# Patient Record
Sex: MALE | Race: ASIAN
[De-identification: names, ages, dates, MRNs, and addresses within clinical notes are randomized per-mention and may not be internally consistent; named-entity substitution may affect disease eponyms.]

---
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
## 2022-01-11 ENCOUNTER — TRANSCRIPTION ENCOUNTER (OUTPATIENT)
Age: 22
End: 2022-01-11

## 2023-05-03 ENCOUNTER — EMERGENCY (EMERGENCY)
Facility: HOSPITAL | Age: 23
LOS: 0 days | Discharge: HOME | End: 2023-05-03
Attending: EMERGENCY MEDICINE
Payer: COMMERCIAL

## 2023-05-03 VITALS
WEIGHT: 179.9 LBS | OXYGEN SATURATION: 99 % | HEIGHT: 68 IN | SYSTOLIC BLOOD PRESSURE: 132 MMHG | TEMPERATURE: 98 F | HEART RATE: 80 BPM | DIASTOLIC BLOOD PRESSURE: 89 MMHG | RESPIRATION RATE: 16 BRPM

## 2023-05-03 DIAGNOSIS — Z20.3 CONTACT WITH AND (SUSPECTED) EXPOSURE TO RABIES: ICD-10-CM

## 2023-05-03 PROCEDURE — 99283 EMERGENCY DEPT VISIT LOW MDM: CPT

## 2023-05-03 PROCEDURE — 99282 EMERGENCY DEPT VISIT SF MDM: CPT

## 2023-05-03 NOTE — ED PROVIDER NOTE - NSFOLLOWUPINSTRUCTIONS_ED_ALL_ED_FT
Animal Bite, Adult  Animal bites can be mild or serious. Small bites from house pets normally are mild. Bites from cats, strays, or wild animals can be serious. If a stray or wild animal bites you, you need to get medical help right away. You may also need a shot to prevent rabies infection.    What increases the risk?  Being near pets you do not know.  Being near animals that are eating, sleeping, or caring for their babies.  Being outside where small, wild animals move freely.  What are the signs or symptoms?  Pain.  Bleeding.  Swelling.  Bruising.  How is this treated?  Treatment may include:  Cleaning your wound.  Rinsing out (flushing) your wound. This uses saline solution, which is made of salt and water.  Putting a bandage on your wound.  Closing your wound with stitches (sutures), staples, skin glue, or skin tape (adhesive strips).  Antibiotic medicine. You may be given pills, cream, gel, or fluid through an IV.  A tetanus shot.  Rabies treatment, if the animal could have rabies.  Surgery, if there is infection or damage that needs to be fixed.  Follow these instructions at home:  Medicines    Take or apply over-the-counter and prescription medicines only as told by your doctor.  If you were prescribed an antibiotic medicine, take or apply it as told by your doctor. Do not stop using it even if your wound gets better.  Wound care    Two stitched wounds. One is normal. The other is red with pus and infected.  Follow instructions from your doctor about how to take care of your wound. Make sure you:  Wash your hands with soap and water for at least 20 seconds before and after you change your bandage. If you cannot use soap and water, use hand .  Change your bandage.  Leave stitches or skin glue in place for at least 2 weeks.  Leave tape strips alone unless you are told to take them off. You may trim the edges of the tape strips if they curl up.  Check your wound every day for signs of infection. Check for:  More redness, swelling, or pain.  More fluid or blood.  Warmth.  Pus or a bad smell.  General instructions    Bag of ice on a towel on the skin.   Raise (elevate) the injured area above the level of your heart while you are sitting or lying down.  If told, put ice on the injured area. To do this:  Put ice in a plastic bag.  Place a towel between your skin and the bag.  Leave the ice on for 20 minutes, 2–3 times per day.  Take off the ice if your skin turns bright red. This is very important. If you cannot feel pain, heat, or cold, you have a greater risk of damage to the area.  Keep all follow-up visits.  Contact a doctor if:  You have more redness, swelling, or pain around your wound.  Your wound feels warm to the touch.  You have a fever or chills.  You have a general feeling of sickness (malaise).  You feel like you may vomit.  You vomit.  You have pain that does not get better.  Get help right away if:  You have a red streak going away from your wound.  You have any of these coming from your wound:  Non-clear fluid.  More blood.  Pus or a bad smell.  You have trouble moving your injured area.  You lose feeling (have numbness) or feel tingling anywhere on your body.  Summary  Animal bites can be mild or serious.  If a stray or wild animal bites you, you need to get medical help right away.  Your doctor will look at the wound and may ask about how the animal bite happened.  Treatment may include wound care, antibiotic medicine, a tetanus shot, and rabies treatment.  This information is not intended to replace advice given to you by your health care provider. Make sure you discuss any questions you have with your health care provider.

## 2023-05-03 NOTE — ED PROVIDER NOTE - ATTENDING APP SHARED VISIT CONTRIBUTION OF CARE
I personally evaluated the patient. I reviewed the Resident´s or Physician Assistant´s note (as assigned above), and agree with the findings and plan except as documented in my note.    22-year-old male presents to the emergency department complaining of bite to his finger from a known cat.  Denies fever pain, swelling, skin breakdown    Review of systems otherwise unremarkable    GENERAL: male in no distress.   CHEST: normal work of breathing noted.   CV: pulses intact  EXTR: FROM   NEURO: AAO 3 no focal deficits  SKIN: Puncture wound noted to pad space of right middle finger  PSYCH: normal mood & mentation    Impression: Cat bite    Plan: Supportive care, wound management, return and follow-up instructions

## 2023-05-03 NOTE — ED PROVIDER NOTE - OBJECTIVE STATEMENT
23yo male presents s/p cat bite 1 week prior and inquiring whether he needs a rabies series. Pt reports was bitten by a cat that he feeds routinely outside and was bitten to dorsum of R hand. Denies redness, swelling or drainage to site. He reports wound has been well healing and he can observe cat as cat still visits the outside of his home. He reports cat is well-appearing.

## 2023-05-03 NOTE — ED PROVIDER NOTE - PHYSICAL EXAMINATION
CONST: Well appearing in NAD  EYES: PERRL, EOMI, Sclera and conjunctiva clear.   MS: Normal ROM in all extremities. No midline spinal tenderness.  SKIN: R dorsum of hand with well-healed punctures. No cellulitis.  NEURO: A&Ox3, No focal deficits. Strength 5/5 with no sensory deficits.

## 2023-05-03 NOTE — ED PROVIDER NOTE - CLINICAL SUMMARY MEDICAL DECISION MAKING FREE TEXT BOX
22-year-old male presents to the emergency department for cat bite.  In the emergency department screening exam, no indication for antibiotics, questions answered about rabies concerns, cat is known to the patient.  Will discharge with outpatient management with return and follow-up instructions.  Patient expresses understanding of medical decision making.

## 2023-05-03 NOTE — ED PROVIDER NOTE - NS ED ATTENDING STATEMENT MOD
This was a shared visit with the ASHVIN. I reviewed and verified the documentation and independently performed the documented:

## 2023-05-03 NOTE — ED PROVIDER NOTE - PATIENT PORTAL LINK FT
You can access the FollowMyHealth Patient Portal offered by [facility] by registering at the following website: [email or web address]. By joining [facility]’s FollowMyHealth portal, you will also be able to view your health information using other applications (apps) compatible with our system.
